# Patient Record
(demographics unavailable — no encounter records)

---

## 2017-09-28 NOTE — HP
DATE OF ADMISSION:  09/28/2017

 

PRIMARY CARE PHYSICIAN:  Kailey Montaño NP

 

CHIEF COMPLAINT:  Chest pain for 1 month on and off.

 

HISTORY OF PRESENT ILLNESS:  Ms. Saucedo is a pleasant 48-year-old -American female with pas
t medical history of hypertension, dyslipidemia, and borderline diabetes who presented to the emerge
ncy room with the above-mentioned complaint.  History is mainly obtained by the patient herself and 
the electronic medical records have been reviewed.  The patient was last seen in our facility 2 year
s ago for similar complaints and underwent a nuclear medicine stress test which showed EF of 83% and
 no evidence of ischemia.

 

Ms. Saucedo reports that she has been having a sharp squeezing pain, 10/10 in intensity on and off 
for almost a month now.  She cannot recall any exacerbating or relieving factors.  It can come on at
 rest also.  She has taken aspirin in the home for this without any benefit.  It is associated with 
some dizziness and diaphoresis, but no shortness of breath or palpitations.  It is located in the le
ft anterior chest without any radiation.  It is not associated with arm or jaw claudication.  She de
nies any recent travel.  She denies any recent illnesses or trauma.  She does report repeatedly bein
g under significant stress.  She just received report this morning that one of her aunts has less th
an in 2 weeks to live.  She reports that she has with a cardiac catheterization done in 2011, which 
was normal.  She also reported that she had some kind of imaging done last year at work and was told
 that she has some plaque in the left side of her cardiac blood vessel.

 

In the emergency room upon presentation, she was hemodynamically stable.  Her initial cardiac enzyme
s were normal.  Her EKG was unremarkable with normal sinus rhythm and no acute ST or T-wave changes.
  She was given nitroglycerin as well as aspirin in the Emergency Room along with GI cocktail and is
 now being admitted for further workup for acute coronary syndrome rule out.

 

The patient does report lot of heart burn.  She is currently on any antacid at home.  She denies any
 abdominal pain, hematochezia, or melena.  She denies any dysuria, hematuria or frequency.  She does
 reports some decreased urination in the last 4 or 3 days.

 

The patient reports symptoms consistent with menopause with heavy bleeding intermittently with no bl
eeding at all.  She denies any chances of her being pregnant at this time.  She has a followup appoi
ntment with OB/GYN up coming in next few months.

 

PAST MEDICAL HISTORY:

1.  Hypertension.

2.  Dyslipidemia.

3.  Diet controlled diabetes mellitus.

4.  History of asthma.

 

PAST SURGICAL HISTORY:  Tubal ligation and cardiac catheterization in 2011.

 

FAMILY HISTORY:  Significant for hypertension in the mother.  One of her grandmother and two of her 
aunts have had coronary artery disease.

 

SOCIAL HISTORY:  She is  and  is at bedside.  No history of drug, tobacco or alcohol a
buse.

 

ALLERGIES:  CODEINE causes nausea and a rash.

 

CURRENT MEDICATIONS:  She takes aspirin 81 mg daily and  blood pressure and cholesterol medication, 
but is unclear as to which

 

CODE STATUS:  FULL code.  Discussed with the patient.

 

REVIEW OF SYSTEMS:  The following complete review of systems was negative, unless otherwise mentione
d in the HPI or below:

Constitutional:  Weight loss or gain, ability to conduct usual activities.

Skin:  Rash, itching.

Eyes:  Double vision, pain.

ENT/Mouth:  Nose bleeding, neck stiffness, pain, tenderness.

Cardiovascular:  Palpitations, dyspnea on exertion, orthopnea.

Respiratory:  Shortness of breath, wheezing, cough, hemoptysis, fever or night sweats.

Gastrointestinal:  Poor appetite, abdominal pain, heartburn, nausea, vomiting, constipation, or diar
pk.

Genitourinary:  Urgency, frequency, dysuria, nocturia.

Musculoskeletal:  Pain, swelling.

Neurologic/Psychiatric:  Anxiety, depression.

Allergy/Immunologic:  Skin rash, bleeding tendency.

 

LABORATORY DATA AND IMAGINGS:  Her CBC shows WBCs at 7.6 with 57% neutrophils.  Hemoglobin is 11.4 w
ith a low MCV and MCH.  Platelet count 306,000.  D-dimer is less than 0.27.  Serum chemistries unrem
arkable except for creatinine kinase mildly elevated at 226.  Cardiac enzymes negative x1.  BNP norm
al.  Lipid panel and lipase are normal.  Urinalysis showed trace leukocyte esterase and +2 bacteria.
  Chest x-ray by my review has no evidence of pleural effusion, edema or infiltrate.  A 12-lead EKG 
by my review shows normal sinus rhythm without any acute ST or T-wave changes to suggest ACS.

 

PHYSICAL EXAMINATION:

VITAL SIGNS:  Upon presentation include blood pressure 144/81, pulse of 62, saturating 100% on room 
air, respirations 17, temperature 98.5.

GENERAL:  In no acute distress, awake, alert, oriented x3.  She is well-built and well dressed and v
nadir pleasant.

HEENT EXAMINATION:  Mucous membrane is moist and pink.  Head is normocephalic, atraumatic.  Pupils e
qual, reactive to light and accommodation.  Extraocular muscles are intact.

NECK:  Supple without any lymphadenopathy, JVD or bruit.

CHEST:  Clear to auscultation without any wheezing, rales or rhonchi.

CARDIOVASCULAR:  Rate rhythm is regular without any murmur, rubs or gallops.  She is nontender to pa
lpation on the anterior chest.

ABDOMEN:  Slightly obese, soft, nontender, nondistended with positive bowel sounds.  She does not ha
ve any right upper quadrant tenderness or flank tenderness.

EXTREMITIES:  Free of any cyanosis, clubbing, or edema.

PSYCHIATRY EXAMINATION:  She appears anxious at this time and easily tearful.

NEUROLOGIC EXAMINATION:  Nonfocal.

SKIN:  Free of any rashes or bruises.  Feels warm and dry to touch.

 

IMPRESSION AND PLAN:

1.  Chest pain.  The patient has multiple risk factors including family history of diabetes, hyperte
nsion, and dyslipidemia.  D-dimer has been negative, so pulmonary embolus possibility is very low; a
lmost entirely ruled out.  I think her symptoms are more consistent with heartburn along with the st
ress.  However, given the multiple risk factors, we will admit her overnight in the hospital and do 
serial cardiac enzymes and telemetry monitoring.  We will repeat the stress test in the morning.  I 
do not feel that this is unstable angina.  We will give her a trial of proton pump inhibitor to see 
if the symptoms are improved.  If her stress test is abnormal or if her chest pain does not go away:
  We will consider Cardiology consultation in the morning.

2.  Urinary tract infection.  The patient seems to be having early urinary tract infection and is mi
ldly symptomatic.  She will be treated with oral antibiotics and we will send the urine for culture.
  Test urine for pregnancy also.

3.  Iron deficiency anemia, most likely she is deficient in the iron with heavy perimenopausal sympt
oms.  We will check iron indices and start her on supplementation as necessary.

4.  Dyslipidemia.  Her lipid panel is pretty much normal today.  She will continue to take her medic
ations that she takes at home.

5.  Hypertension.  Blood pressure is under well control today.  We will add p.r.n. antihypertensives
 and resume her home medications once confirmed.

6.  Code status:  FULL code.  Discussed with the patient.

7.  Add p.r.n. medication ordered and deep venous thrombosis and gastrointestinal prophylaxis.

 

DISPOSITION:  The patient is being admitted for ACS workup.  She also seems to have early UTI.  Furt
her management will depend upon her clinical course.

## 2017-09-28 NOTE — RAD
PORTABLE CHEST 1 VIEW:

 

Date:  09/28/17 

Time:  1455 hours

 

HISTORY:  

Chest pain. 

 

FINDINGS:

 

Comparison made with exam of 06/29/17. 

 

The heart size is normal. The lungs are well expanded without focal areas of consolidation, pneumoth
orax, or pleural effusions. 

 

IMPRESSION: 

No acute process. 

 

 

 

POS: TALHAH

## 2017-09-29 NOTE — DIS
DATE OF ADMISSION:  09/28/2017

 

DATE OF DISCHARGE:  09/29/2017

 

PRIMARY CARE PHYSICIAN:  Kailey Montaño, Family nurse practitioner.

 

DISCHARGE DISPOSITION:  Home.

 

PRIMARY DISCHARGE DIAGNOSIS:  Chest pain, ruled out acute coronary syndrome.

 

SECONDARY DISCHARGE DIAGNOSES:  Obesity with BMI 32, dyslipidemia, asthma.

 

PRIMARY PROCEDURE/OPERATION:  None.

 

RADIOLOGICAL INVESTIGATION:  Chest x-ray normal.  Stress test negative for any ischemia.

 

SIGNIFICANT LABORATORY DATA:  WBC 7.0, hemoglobin 11.3, platelets 254.  D-dimer less than 0.27.  Sod
ium 136, potassium 4.2, BUN 9, creatinine 0.69, calcium 9.0.  Cardiac enzymes negative x3.  LDL 94. 
 Urinalysis:  Leukocyte esterase trace.  Urine culture negative.   Pregnancy test negative.

 

DISCHARGE MEDICATIONS:  ProAir HFA 2 puffs q.6 hourly p.r.n., aspirin 81 mg p.o. daily, Lipitor 20 m
g p.o. at bedtime, Pepcid 20 mg p.o. b.i.d.

 

CONTRAINDICATIONS:  None.

 

CODE STATUS:  FULL CODE.

 

INPATIENT CONSULTANTS:  None.

 

ALLERGIES:  CODEINE.

 

DISCHARGE PLAN:  Post hospital, the patient will follow up with primary care physician in 1 week.

 

HOSPITAL COURSE:  The patient is a 48-year-old female who was admitted by Dr. Acosta yesterday, ple
ase see her H\T\P for further details.  This patient came to the emergency room for chest pain for 1
 month on and off.  Based on her description, patient's chest pain was not cardiac in nature.  There
 was concern of underlying acid reflux, but to rule out acute coronary syndrome, this patient was ad
mitted to observation unit and we did serial cardiac enzyme and that were negative.  Her EKG on admi
ssion was also normal sinus rhythm without any ischemic changes.  Her chest x-ray was normal.  The p
atient was admitted to telemetry floor and subsequent cardiac enzymes remained negative.  Patient wa
s also asymptomatic while in hospital.  While in the hospital, her diabetes was also well controlled
 with diet.  During this admission, blood pressure was running on the lower side and that is why hyd
rochlorothiazide was advised to kept on hold and see primary care physician for further adjustment o
f blood pressure medication.  As we are suspecting acid reflux and that is why we are considering Pe
pcid on discharge.

 

Patient had suspected urinary tract infections and that is why levofloxacin was given, but as cultur
es remain negative and patient does not have any further symptoms of UTI that is why antibiotic ther
apy was not given.

 

The patient is seen and examined today.  All test results discussed with the patient in detail.  The
 patient is medically stable for discharge.

 

PHYSICAL EXAMINATION:

VITAL SIGNS:  Currently, temperature 98.0, pulse 67, respiratory rate 16, saturation 98%, blood pres
sure 122/67, weight 175 pounds.

GENERAL:  The patient is currently alert, awake, no acute distress.

HEAD:  Normocephalic, atraumatic.

LUNGS:  Clear.

CARDIAC:  S1, S2 regular without any murmur.

ABDOMEN:  Soft and benign.

EXTREMITIES:  No edema.

NEUROLOGIC:  Nonfocal examination.

## 2017-09-29 NOTE — NM
CARDIAC SPECT:

 

CLINICAL HISTORY:

48-year-old female with chest pain, hypertension, diabetes, asthma, and dyslipidemia. Family history
 of coronary artery disease. 

 

TECHNIQUE:  

A myocardial perfusion scan was performed using the single isotope one day protocol with technetium-
99m sestamibi. 10 mCi were injected intravenously for the rest exam followed by 30 mCi for the stres
s exam. Pharmacologic stress with Lexiscan was monitored and interpreted by Dr. Scott. 

 

FINDINGS:

Homogeneous tracer distribution is seen in the myocardial segments on stress and rest images without
 fixed or reversible defects. 

 

GATED SPECT LVEF:

76%. 

 

WALL MOTION EXAM:

Normal. 

 

IMPRESSION: 

Normal myocardial perfusion scan. 

 

 

 

POS: DIANE